# Patient Record
Sex: FEMALE | Race: WHITE | NOT HISPANIC OR LATINO
[De-identification: names, ages, dates, MRNs, and addresses within clinical notes are randomized per-mention and may not be internally consistent; named-entity substitution may affect disease eponyms.]

---

## 2020-06-08 ENCOUNTER — ERX REFILL RESPONSE (OUTPATIENT)
Age: 80
End: 2020-06-08

## 2020-06-08 RX ORDER — PANTOPRAZOLE SODIUM 40 MG/1
TAKE 1 TABLET BY MOUTH TWICE A DAY TABLET, DELAYED RELEASE ORAL
Qty: 180 | Refills: 2

## 2020-07-15 ENCOUNTER — OFFICE VISIT (OUTPATIENT)
Dept: URBAN - METROPOLITAN AREA CLINIC 19 | Facility: CLINIC | Age: 80
End: 2020-07-15
Payer: COMMERCIAL

## 2020-07-15 DIAGNOSIS — D50.9 IRON DEFICIENCY ANEMIA, UNSPECIFIED IRON DEFICIENCY ANEMIA TYPE: ICD-10-CM

## 2020-07-15 DIAGNOSIS — R10.31 RLQ ABDOMINAL PAIN: ICD-10-CM

## 2020-07-15 DIAGNOSIS — R10.32 LLQ ABDOMINAL PAIN: ICD-10-CM

## 2020-07-15 PROCEDURE — 1036F TOBACCO NON-USER: CPT | Performed by: INTERNAL MEDICINE

## 2020-07-15 PROCEDURE — 99214 OFFICE O/P EST MOD 30 MIN: CPT | Performed by: INTERNAL MEDICINE

## 2020-07-15 PROCEDURE — G8427 DOCREV CUR MEDS BY ELIG CLIN: HCPCS | Performed by: INTERNAL MEDICINE

## 2020-07-15 PROCEDURE — G8938 BMI DOC ONL FUP NT DOC: HCPCS | Performed by: INTERNAL MEDICINE

## 2020-07-15 RX ORDER — PANTOPRAZOLE SODIUM 40 MG/1
TAKE 1 TABLET BY MOUTH TWICE A DAY TABLET, DELAYED RELEASE ORAL
Qty: 180 | Refills: 2 | Status: ACTIVE | COMMUNITY

## 2020-07-15 RX ORDER — SUCRALFATE 1 G/1
1 TABLET ON AN EMPTY STOMACH TABLET ORAL TWICE A DAY
Qty: 180 TABLET | Refills: 3 | OUTPATIENT
Start: 2020-07-15 | End: 2021-07-10

## 2020-07-15 RX ORDER — PANTOPRAZOLE SODIUM 40 MG/1
1 TABLET TABLET, DELAYED RELEASE ORAL ONCE A DAY
Qty: 90 | Refills: 3 | OUTPATIENT
Start: 2020-07-15

## 2020-07-15 RX ORDER — ASPIRIN 81 MG/1
TABLET, COATED ORAL
Qty: 0 | Refills: 0 | Status: ACTIVE | COMMUNITY
Start: 1900-01-01

## 2020-07-15 RX ORDER — SUCRALFATE 1 G/1
TAKE 1 TAB BY MOUTH 4 TIMES PER DAY ON AN EMPTY STOMACH 1 HOUR BEFORE MEALS & AT BEDTIME FOR 30 DAYS TABLET ORAL
Qty: 120 | Refills: 1 | Status: ACTIVE | COMMUNITY
Start: 2019-05-21

## 2020-07-15 RX ORDER — PANTOPRAZOLE SODIUM 40 MG
TAKE 1 TABLET BY MOUTH TWICE A DAY TABLET, DELAYED RELEASE (ENTERIC COATED) ORAL
Qty: 180 | Refills: 2 | Status: ACTIVE | COMMUNITY
Start: 2019-09-17

## 2020-07-15 NOTE — HPI-TODAY'S VISIT:
Mrs. Valenzuela is a 80 year old female who was last seen in GI clinic on 1/22/2020 for anemia.    She reports her stool varies in color. She states the stool can be brown, black or yellow. She estimates it has been a month since last having black stool.   Her last HgB was 2-3 months ago. She reports her last iron infusion was around 9/2019.   Today she reports having abdominal pain in RLQ and LLQ. She reports the pain has been present for approximately 2 months. She report the pain can be intense and sharp in character. She reports the pain is worse with defecation. She reports her stools are soft. She reports having a BM once a day and reports the pain is sporadic.   She reports currently being on xalerto. She reports taking protonix 40mg daily and carafate daily.  Prior history is summarized below: -She had an EGD on 1/3/2019 that showed a medium size hiatal hernia and few clean-based gastric ulcers. Pathology from the gastric biopsies were negative for H. pylori.  -On 1/4/2019 she had a colonoscopy that showed a 3 mm sigmoid colon polyp, sigmoid colon diverticulosis and normal appearing terminal ileum. Pathology report mentions polyp was an "adenoma."  -HgB on 1/5/2019 was 7.7.  -She had an EGD on 7/9/2019 which showed moderate hiatal hernia and moderate hemorrhagic gastritis and several erosions in the gastric antrum. Gastric biopsies were negative for H. pylori.  -On 1/3/2020 HgB was 8.2.

## 2020-07-17 LAB
BASO (ABSOLUTE): 0
BASOS: 1
EOS (ABSOLUTE): 0.1
EOS: 2
FERRITIN, SERUM: 30
HEMATOCRIT: 35.3
HEMATOLOGY COMMENTS:: (no result)
HEMOGLOBIN: 11.4
IMMATURE CELLS: (no result)
IMMATURE GRANS (ABS): 0
IMMATURE GRANULOCYTES: 1
IRON BIND.CAP.(TIBC): 359
IRON SATURATION: 9
IRON: 34
LYMPHS (ABSOLUTE): 1.3
LYMPHS: 23
MCH: 32.7
MCHC: 32.3
MCV: 101
MONOCYTES(ABSOLUTE): 0.8
MONOCYTES: 13
NEUTROPHILS (ABSOLUTE): 3.7
NEUTROPHILS: 60
NRBC: (no result)
PLATELETS: 180
RBC: 3.49
RDW: 12.3
UIBC: 325
WBC: 5.9

## 2020-07-21 ENCOUNTER — TELEPHONE ENCOUNTER (OUTPATIENT)
Dept: URBAN - METROPOLITAN AREA CLINIC 19 | Facility: CLINIC | Age: 80
End: 2020-07-21

## 2020-07-31 ENCOUNTER — LAB OUTSIDE AN ENCOUNTER (OUTPATIENT)
Dept: URBAN - METROPOLITAN AREA CLINIC 19 | Facility: CLINIC | Age: 80
End: 2020-07-31

## 2020-07-31 LAB
CREATININE POC: 0.7
PERFORMING LAB: (no result)

## 2023-03-17 ENCOUNTER — LAB OUTSIDE AN ENCOUNTER (OUTPATIENT)
Dept: URBAN - METROPOLITAN AREA CLINIC 19 | Facility: CLINIC | Age: 83
End: 2023-03-17

## 2023-03-17 ENCOUNTER — OFFICE VISIT (OUTPATIENT)
Dept: URBAN - METROPOLITAN AREA CLINIC 19 | Facility: CLINIC | Age: 83
End: 2023-03-17
Payer: COMMERCIAL

## 2023-03-17 ENCOUNTER — WEB ENCOUNTER (OUTPATIENT)
Dept: URBAN - METROPOLITAN AREA CLINIC 19 | Facility: CLINIC | Age: 83
End: 2023-03-17

## 2023-03-17 VITALS
BODY MASS INDEX: 25.8 KG/M2 | TEMPERATURE: 96.8 F | SYSTOLIC BLOOD PRESSURE: 124 MMHG | OXYGEN SATURATION: 72 % | DIASTOLIC BLOOD PRESSURE: 54 MMHG | HEIGHT: 62 IN | HEART RATE: 67 BPM | WEIGHT: 140.2 LBS

## 2023-03-17 DIAGNOSIS — K92.1 BLACK STOOLS: ICD-10-CM

## 2023-03-17 DIAGNOSIS — R10.9 INTERMITTENT ABDOMINAL PAIN: ICD-10-CM

## 2023-03-17 DIAGNOSIS — R68.81 EARLY SATIETY: ICD-10-CM

## 2023-03-17 DIAGNOSIS — R12 HEARTBURN: ICD-10-CM

## 2023-03-17 DIAGNOSIS — R11.10 REGURGITATION OF FOOD: ICD-10-CM

## 2023-03-17 PROCEDURE — 99214 OFFICE O/P EST MOD 30 MIN: CPT | Performed by: NURSE PRACTITIONER

## 2023-03-17 RX ORDER — PANTOPRAZOLE SODIUM 40 MG/1
TAKE 1 TABLET BY MOUTH TWICE A DAY TABLET, DELAYED RELEASE ORAL
Qty: 180 | Refills: 2 | Status: ACTIVE | COMMUNITY

## 2023-03-17 RX ORDER — PANTOPRAZOLE SODIUM 40 MG
TAKE 1 TABLET BY MOUTH TWICE A DAY TABLET, DELAYED RELEASE (ENTERIC COATED) ORAL
Qty: 180 | Refills: 2 | Status: ACTIVE | COMMUNITY
Start: 2019-09-17

## 2023-03-17 NOTE — HPI-TODAY'S VISIT:
Mrs. Valenzuela is an 84 yo female with PMH of HTN, RADHA, CAD, aortic valve replacement 2/23/2023 on Xarelto who presents today for c/o diarrhea. Stools can alternate between normal to watery. She states ongoing for many years. No fever/chills or sick contacts. No new diet or meds. No recent travel. Few weeks ago she had 5 episodes of black stool which now resolved. She reports feeling full easily. Eats about 4-5 bites and has to wait awhile before she can eat again. No appetite. Ongoing about 6 months but no weight loss. Heartburn and reflux. Takes garret chewables which helps. She had pain last week in her upper back that radiated to the front LUQ area. Reports some regurgitaion, nausea, but no vomiting.   She sees Dr. Parker with Hematology. Last EGD was done by Dr. Canales 7/9/2019.  Normal esophagus, medium sized hiatal hernia, hemorrhagic gastritis with several gastric erosions, normal duodenum.  Path pathology from stomach antrum biopsy-reparative-type changes.  No H. pylori. Last colonoscopy was done on 1/5/2019.  Examined portion of ileum was normal.  A 3 mm polyp in the sigmoid colon.  Diverticulosis in the sigmoid colon.  Path from sigmoid colon biopsy with adenoma.

## 2023-03-30 ENCOUNTER — LAB OUTSIDE AN ENCOUNTER (OUTPATIENT)
Dept: URBAN - METROPOLITAN AREA CLINIC 19 | Facility: CLINIC | Age: 83
End: 2023-03-30

## 2023-03-30 ENCOUNTER — OUT OF OFFICE VISIT (OUTPATIENT)
Dept: URBAN - METROPOLITAN AREA MEDICAL CENTER 25 | Facility: MEDICAL CENTER | Age: 83
End: 2023-03-30

## 2023-03-30 ENCOUNTER — CLAIMS CREATED FROM THE CLAIM WINDOW (OUTPATIENT)
Dept: URBAN - METROPOLITAN AREA MEDICAL CENTER 25 | Facility: MEDICAL CENTER | Age: 83
End: 2023-03-30
Payer: COMMERCIAL

## 2023-03-30 DIAGNOSIS — I48.91 A-FIB: ICD-10-CM

## 2023-03-30 DIAGNOSIS — R10.13 ABDOMINAL DISCOMFORT, EPIGASTRIC: ICD-10-CM

## 2023-03-30 DIAGNOSIS — K92.1 ACUTE MELENA: ICD-10-CM

## 2023-03-30 PROCEDURE — 99222 1ST HOSP IP/OBS MODERATE 55: CPT | Performed by: INTERNAL MEDICINE

## 2023-03-30 PROCEDURE — G8427 DOCREV CUR MEDS BY ELIG CLIN: HCPCS | Performed by: INTERNAL MEDICINE

## 2023-03-31 ENCOUNTER — CLAIMS CREATED FROM THE CLAIM WINDOW (OUTPATIENT)
Dept: URBAN - METROPOLITAN AREA MEDICAL CENTER 25 | Facility: MEDICAL CENTER | Age: 83
End: 2023-03-31

## 2023-03-31 ENCOUNTER — TELEPHONE ENCOUNTER (OUTPATIENT)
Dept: URBAN - METROPOLITAN AREA CLINIC 19 | Facility: CLINIC | Age: 83
End: 2023-03-31

## 2023-03-31 ENCOUNTER — CLAIMS CREATED FROM THE CLAIM WINDOW (OUTPATIENT)
Dept: URBAN - METROPOLITAN AREA MEDICAL CENTER 25 | Facility: MEDICAL CENTER | Age: 83
End: 2023-03-31
Payer: COMMERCIAL

## 2023-03-31 DIAGNOSIS — K29.60 ADENOPAPILLOMATOSIS GASTRICA: ICD-10-CM

## 2023-03-31 DIAGNOSIS — K92.1 ACUTE MELENA: ICD-10-CM

## 2023-03-31 PROCEDURE — 43239 EGD BIOPSY SINGLE/MULTIPLE: CPT | Performed by: INTERNAL MEDICINE

## 2023-04-01 ENCOUNTER — CLAIMS CREATED FROM THE CLAIM WINDOW (OUTPATIENT)
Dept: URBAN - METROPOLITAN AREA MEDICAL CENTER 25 | Facility: MEDICAL CENTER | Age: 83
End: 2023-04-01

## 2023-04-01 ENCOUNTER — CLAIMS CREATED FROM THE CLAIM WINDOW (OUTPATIENT)
Dept: URBAN - METROPOLITAN AREA MEDICAL CENTER 25 | Facility: MEDICAL CENTER | Age: 83
End: 2023-04-01
Payer: COMMERCIAL

## 2023-04-01 DIAGNOSIS — K92.1 ACUTE MELENA: ICD-10-CM

## 2023-04-01 DIAGNOSIS — D64.89 ANEMIA DUE TO OTHER CAUSE: ICD-10-CM

## 2023-04-01 DIAGNOSIS — I48.91 A-FIB: ICD-10-CM

## 2023-04-01 PROCEDURE — 99233 SBSQ HOSP IP/OBS HIGH 50: CPT | Performed by: STUDENT IN AN ORGANIZED HEALTH CARE EDUCATION/TRAINING PROGRAM

## 2023-04-01 PROCEDURE — 99232 SBSQ HOSP IP/OBS MODERATE 35: CPT | Performed by: STUDENT IN AN ORGANIZED HEALTH CARE EDUCATION/TRAINING PROGRAM

## 2023-04-20 ENCOUNTER — OFFICE VISIT (OUTPATIENT)
Dept: URBAN - METROPOLITAN AREA CLINIC 19 | Facility: CLINIC | Age: 83
End: 2023-04-20
Payer: COMMERCIAL

## 2023-04-20 ENCOUNTER — WEB ENCOUNTER (OUTPATIENT)
Dept: URBAN - METROPOLITAN AREA CLINIC 19 | Facility: CLINIC | Age: 83
End: 2023-04-20

## 2023-04-20 VITALS
TEMPERATURE: 96.8 F | HEIGHT: 62 IN | WEIGHT: 137.4 LBS | SYSTOLIC BLOOD PRESSURE: 106 MMHG | BODY MASS INDEX: 25.28 KG/M2 | DIASTOLIC BLOOD PRESSURE: 60 MMHG

## 2023-04-20 DIAGNOSIS — R12 HEARTBURN: ICD-10-CM

## 2023-04-20 DIAGNOSIS — K92.1 BLACK STOOLS: ICD-10-CM

## 2023-04-20 DIAGNOSIS — K25.3 ACUTE CAMERON ULCER: ICD-10-CM

## 2023-04-20 DIAGNOSIS — K44.9 HIATAL HERNIA: ICD-10-CM

## 2023-04-20 DIAGNOSIS — D50.0 IRON DEFICIENCY ANEMIA DUE TO CHRONIC BLOOD LOSS: ICD-10-CM

## 2023-04-20 DIAGNOSIS — R68.81 EARLY SATIETY: ICD-10-CM

## 2023-04-20 DIAGNOSIS — R10.12 LUQ ABDOMINAL PAIN: ICD-10-CM

## 2023-04-20 PROBLEM — 724556004: Status: ACTIVE | Noted: 2023-04-20

## 2023-04-20 PROCEDURE — 99214 OFFICE O/P EST MOD 30 MIN: CPT | Performed by: NURSE PRACTITIONER

## 2023-04-20 RX ORDER — SUCRALFATE 1 G/1
1 TABLET 30 MINS BEFORE MEALS, DISSOLVE IN 2 TBS WATER TABLET ORAL TWICE A DAY
Qty: 60 | Refills: 0 | OUTPATIENT
Start: 2023-04-20 | End: 2023-05-20

## 2023-04-20 RX ORDER — PANTOPRAZOLE SODIUM 40 MG/1
1 TABLET TABLET, DELAYED RELEASE ORAL TWICE A DAY
Qty: 60 TABLET | Refills: 1 | OUTPATIENT
Start: 2023-04-20

## 2023-04-20 RX ORDER — PANTOPRAZOLE SODIUM 40 MG
TAKE 1 TABLET BY MOUTH TWICE A DAY TABLET, DELAYED RELEASE (ENTERIC COATED) ORAL
Qty: 180 | Refills: 2 | Status: ACTIVE | COMMUNITY
Start: 2019-09-17

## 2023-04-20 RX ORDER — PANTOPRAZOLE SODIUM 40 MG/1
TAKE 1 TABLET BY MOUTH TWICE A DAY TABLET, DELAYED RELEASE ORAL
Qty: 180 | Refills: 2 | Status: ACTIVE | COMMUNITY

## 2023-04-20 NOTE — HPI-TODAY'S VISIT:
3/17/2023 Roselia Chávez NP   Mrs. Valenzuela is an 84 yo female with PMH of HTN, RADHA, CAD, aortic valve replacement 2/23/2023 on Xarelto who presents today for c/o diarrhea. Stools can alternate between normal to watery. She states ongoing for many years. No fever/chills or sick contacts. No new diet or meds. No recent travel. Few weeks ago she had 5 episodes of black stool which now resolved. She reports feeling full easily. Eats about 4-5 bites and has to wait awhile before she can eat again. No appetite. Ongoing about 6 months but no weight loss. Heartburn and reflux. Takes garret chewables which helps. She had pain last week in her upper back that radiated to the front LUQ area. Reports some regurgitaion, nausea, but no vomiting.   She sees Dr. Parker with Hematology. Last EGD was done by Dr. Canales 7/9/2019.  Normal esophagus, medium sized hiatal hernia, hemorrhagic gastritis with several gastric erosions, normal duodenum.  Path pathology from stomach antrum biopsy-reparative-type changes.  No H. pylori. Last colonoscopy was done on 1/5/2019.  Examined portion of ileum was normal.  A 3 mm polyp in the sigmoid colon.  Diverticulosis in the sigmoid colon.  Path from sigmoid colon biopsy with adenoma. PLAN Labs, EGD/colonoscopy, ultrasound  TODAY 4/20/23 She was admitted to the hospital 3/30/2023 after she presented for black tarry stools, weakness, pale.  History of CHF, A-fib on Xarelto, pulmonary hypertension, aortic valve replacement. Hemoglobin on admission 6.1,  Ferritin 26, iron 19/l, iron saturation 7%/l Abdominal x-ray normal Right upper quadrant ultrasound small right pleural effusion no evidence of cholelithiasis She underwent an EGD 3/31/2023 by Dr. Redding medium hiatal hernia, few nonbleeding superficial [place] ulcers,   BX shows active inflammation and reactive changes. She is to take Protonix twice daily.  She has not had any further black stools. She has not had any labs since leaving the hospital. She thinks she is taking her Pantoprazole BID but seemed a little confused about it. She is not on any iron currently.  She recalls taking Aleve prior to ER visit, along with her Xarelto that she takes daily, no taking any NSAIDS at this time. She does have LUQ pain that radiates into back, intermittent, at random, no improvement since leaving hospital.  Loose stools occur about once a month.   PLAN carafate BID, cont PPI BID, no NSAIDS, recheck labs/iron, hold on colonoscopy at this time.

## 2023-04-21 LAB
ABSOLUTE BASOPHILS: 38
ABSOLUTE EOSINOPHILS: 38
ABSOLUTE LYMPHOCYTES: 1015
ABSOLUTE MONOCYTES: 767
ABSOLUTE NEUTROPHILS: 3542
BASOPHILS: 0.7
EOSINOPHILS: 0.7
FERRITIN, SERUM: 18
HEMATOCRIT: 26.8
HEMOGLOBIN: 8
IRON BIND.CAP.(TIBC): 357
IRON SATURATION: 6
IRON: 21
LYMPHOCYTES: 18.8
MCH: 26.3
MCHC: 29.9
MCV: 88.2
MONOCYTES: 14.2
MPV: 11.4
NEUTROPHILS: 65.6
PLATELET COUNT: 213
RDW: 15.6
RED BLOOD CELL COUNT: 3.04
WHITE BLOOD CELL COUNT: 5.4

## 2023-04-24 ENCOUNTER — TELEPHONE ENCOUNTER (OUTPATIENT)
Dept: URBAN - METROPOLITAN AREA CLINIC 18 | Facility: CLINIC | Age: 83
End: 2023-04-24

## 2023-04-24 ENCOUNTER — TELEPHONE ENCOUNTER (OUTPATIENT)
Dept: URBAN - METROPOLITAN AREA CLINIC 19 | Facility: CLINIC | Age: 83
End: 2023-04-24

## 2023-04-24 RX ORDER — FERRIC CARBOXYMALTOSE INJECTION 50 MG/ML
AS DIRECTED INJECTION, SOLUTION INTRAVENOUS
Qty: 2 | Refills: 0 | OUTPATIENT
Start: 2023-04-24

## 2023-04-24 RX ORDER — PANTOPRAZOLE SODIUM 40 MG/1
TAKE 1 TABLET BY MOUTH TWICE A DAY TABLET, DELAYED RELEASE ORAL
Qty: 180 | Refills: 2 | Status: ACTIVE | COMMUNITY

## 2023-04-24 RX ORDER — PANTOPRAZOLE SODIUM 40 MG/1
1 TABLET TABLET, DELAYED RELEASE ORAL TWICE A DAY
Qty: 60 TABLET | Refills: 1 | Status: ACTIVE | COMMUNITY
Start: 2023-04-20

## 2023-04-24 RX ORDER — PANTOPRAZOLE SODIUM 40 MG
TAKE 1 TABLET BY MOUTH TWICE A DAY TABLET, DELAYED RELEASE (ENTERIC COATED) ORAL
Qty: 180 | Refills: 2 | Status: ACTIVE | COMMUNITY
Start: 2019-09-17

## 2023-04-24 RX ORDER — SUCRALFATE 1 G/1
1 TABLET 30 MINS BEFORE MEALS, DISSOLVE IN 2 TBS WATER TABLET ORAL TWICE A DAY
Qty: 60 | Refills: 0 | Status: ACTIVE | COMMUNITY
Start: 2023-04-20 | End: 2023-05-20

## 2023-04-26 ENCOUNTER — TELEPHONE ENCOUNTER (OUTPATIENT)
Dept: URBAN - METROPOLITAN AREA CLINIC 19 | Facility: CLINIC | Age: 83
End: 2023-04-26

## 2023-05-04 ENCOUNTER — ERX REFILL RESPONSE (OUTPATIENT)
Dept: URBAN - METROPOLITAN AREA CLINIC 19 | Facility: CLINIC | Age: 83
End: 2023-05-04

## 2023-05-04 RX ORDER — SUCRALFATE 1 G/1
1 TABLET 30 MINS BEFORE MEALS, DISSOLVE IN 2 TABLETS WATER ORALLY TWICE A DAY 30 DAYS TABLET ORAL
Qty: 60 TABLET | Refills: 0 | OUTPATIENT

## 2023-05-04 RX ORDER — SUCRALFATE 1 G/1
1 TABLET 30 MINS BEFORE MEALS, DISSOLVE IN 2 TBS WATER TABLET ORAL TWICE A DAY
Qty: 60 | Refills: 0 | OUTPATIENT

## 2023-05-30 ENCOUNTER — ERX REFILL RESPONSE (OUTPATIENT)
Dept: URBAN - METROPOLITAN AREA CLINIC 19 | Facility: CLINIC | Age: 83
End: 2023-05-30

## 2023-05-30 RX ORDER — PANTOPRAZOLE SODIUM 40 MG/1
1 TABLET TABLET, DELAYED RELEASE ORAL TWICE A DAY
Qty: 60 TABLET | Refills: 1 | OUTPATIENT

## 2023-05-30 RX ORDER — PANTOPRAZOLE SODIUM 40 MG/1
TAKE 1 TABLET BY MOUTH TWICE A DAY FOR 30 DAYS TABLET, DELAYED RELEASE ORAL
Qty: 60 TABLET | Refills: 1 | OUTPATIENT

## 2023-05-31 ENCOUNTER — ERX REFILL RESPONSE (OUTPATIENT)
Dept: URBAN - METROPOLITAN AREA CLINIC 19 | Facility: CLINIC | Age: 83
End: 2023-05-31

## 2023-05-31 RX ORDER — SUCRALFATE 1 G/1
1 TABLET 30 MINS BEFORE MEALS, DISSOLVE IN 2 TABLETS WATER ORALLY TWICE A DAY 30 DAYS TABLET ORAL
Qty: 180 TABLET | Refills: 1 | OUTPATIENT

## 2023-05-31 RX ORDER — SUCRALFATE 1 G/1
1 TABLET 30 MINS BEFORE MEALS, DISSOLVE IN 2 TABLETS WATER ORALLY TWICE A DAY 30 DAYS TABLET ORAL
Qty: 60 TABLET | Refills: 0 | OUTPATIENT

## 2023-06-21 ENCOUNTER — DASHBOARD ENCOUNTERS (OUTPATIENT)
Age: 83
End: 2023-06-21

## 2023-06-22 ENCOUNTER — OFFICE VISIT (OUTPATIENT)
Dept: URBAN - METROPOLITAN AREA CLINIC 19 | Facility: CLINIC | Age: 83
End: 2023-06-22

## 2023-06-22 RX ORDER — SUCRALFATE 1 G/1
1 TABLET 30 MINS BEFORE MEALS, DISSOLVE IN 2 TABLETS WATER ORALLY TWICE A DAY 30 DAYS TABLET ORAL
Qty: 180 TABLET | Refills: 1 | Status: ACTIVE | COMMUNITY

## 2023-06-22 RX ORDER — PANTOPRAZOLE SODIUM 40 MG/1
TAKE 1 TABLET BY MOUTH TWICE A DAY FOR 30 DAYS TABLET, DELAYED RELEASE ORAL
Qty: 60 TABLET | Refills: 1 | Status: ACTIVE | COMMUNITY

## 2023-06-22 RX ORDER — FERRIC CARBOXYMALTOSE INJECTION 50 MG/ML
AS DIRECTED INJECTION, SOLUTION INTRAVENOUS
Qty: 2 | Refills: 0 | Status: ACTIVE | COMMUNITY
Start: 2023-04-24

## 2023-06-22 RX ORDER — PANTOPRAZOLE SODIUM 40 MG
TAKE 1 TABLET BY MOUTH TWICE A DAY TABLET, DELAYED RELEASE (ENTERIC COATED) ORAL
Qty: 180 | Refills: 2 | Status: ACTIVE | COMMUNITY
Start: 2019-09-17

## 2023-06-28 ENCOUNTER — ERX REFILL RESPONSE (OUTPATIENT)
Dept: URBAN - METROPOLITAN AREA CLINIC 19 | Facility: CLINIC | Age: 83
End: 2023-06-28

## 2023-06-28 RX ORDER — PANTOPRAZOLE SODIUM 40 MG/1
TAKE 1 TABLET BY MOUTH TWICE A DAY FOR 30 DAYS TABLET, DELAYED RELEASE ORAL
Qty: 60 TABLET | Refills: 1 | OUTPATIENT

## 2023-06-28 RX ORDER — PANTOPRAZOLE SODIUM 40 MG/1
1 TABLET TABLET, DELAYED RELEASE ORAL ONCE A DAY
Qty: 30 | Refills: 5 | OUTPATIENT

## 2023-08-04 ENCOUNTER — ERX REFILL RESPONSE (OUTPATIENT)
Dept: URBAN - METROPOLITAN AREA CLINIC 19 | Facility: CLINIC | Age: 83
End: 2023-08-04

## 2023-08-04 RX ORDER — PANTOPRAZOLE SODIUM 40 MG/1
1 TABLET TABLET, DELAYED RELEASE ORAL ONCE A DAY
Qty: 30 | Refills: 5 | OUTPATIENT

## 2023-08-04 RX ORDER — PANTOPRAZOLE SODIUM 40 MG/1
TAKE 1 TABLET BY MOUTH EVERY DAY FOR 30 DAYS TABLET, DELAYED RELEASE ORAL
Qty: 90 TABLET | Refills: 1 | OUTPATIENT

## 2024-05-28 ENCOUNTER — OFFICE VISIT (OUTPATIENT)
Dept: URBAN - METROPOLITAN AREA CLINIC 19 | Facility: CLINIC | Age: 84
End: 2024-05-28
Payer: COMMERCIAL

## 2024-05-28 ENCOUNTER — LAB OUTSIDE AN ENCOUNTER (OUTPATIENT)
Dept: URBAN - METROPOLITAN AREA CLINIC 19 | Facility: CLINIC | Age: 84
End: 2024-05-28

## 2024-05-28 VITALS
SYSTOLIC BLOOD PRESSURE: 140 MMHG | TEMPERATURE: 97.8 F | HEIGHT: 62 IN | HEART RATE: 95 BPM | OXYGEN SATURATION: 99 % | WEIGHT: 131 LBS | DIASTOLIC BLOOD PRESSURE: 90 MMHG | BODY MASS INDEX: 24.11 KG/M2

## 2024-05-28 DIAGNOSIS — R19.7 DIARRHEA: ICD-10-CM

## 2024-05-28 DIAGNOSIS — R10.31 RLQ ABDOMINAL PAIN: ICD-10-CM

## 2024-05-28 PROCEDURE — 99212 OFFICE O/P EST SF 10 MIN: CPT

## 2024-05-28 RX ORDER — PANTOPRAZOLE SODIUM 40 MG
TAKE 1 TABLET BY MOUTH TWICE A DAY TABLET, DELAYED RELEASE (ENTERIC COATED) ORAL
Qty: 180 | Refills: 2 | Status: ACTIVE | COMMUNITY
Start: 2019-09-17

## 2024-05-28 RX ORDER — FERRIC CARBOXYMALTOSE INJECTION 50 MG/ML
AS DIRECTED INJECTION, SOLUTION INTRAVENOUS
Qty: 2 | Refills: 0 | Status: ACTIVE | COMMUNITY
Start: 2023-04-24

## 2024-05-28 RX ORDER — SUCRALFATE 1 G/1
1 TABLET 30 MINS BEFORE MEALS, DISSOLVE IN 2 TABLETS WATER ORALLY TWICE A DAY 30 DAYS TABLET ORAL
Qty: 180 TABLET | Refills: 1 | Status: ACTIVE | COMMUNITY

## 2024-05-28 RX ORDER — PANTOPRAZOLE SODIUM 40 MG/1
TAKE 1 TABLET BY MOUTH EVERY DAY FOR 30 DAYS TABLET, DELAYED RELEASE ORAL
Qty: 90 TABLET | Refills: 1 | Status: ACTIVE | COMMUNITY

## 2024-06-04 ENCOUNTER — LAB OUTSIDE AN ENCOUNTER (OUTPATIENT)
Dept: URBAN - METROPOLITAN AREA CLINIC 19 | Facility: CLINIC | Age: 84
End: 2024-06-04

## 2024-06-04 LAB
CREATININE POC: 0.7
PERFORMING LAB: (no result)

## 2024-06-06 ENCOUNTER — TELEPHONE ENCOUNTER (OUTPATIENT)
Dept: URBAN - METROPOLITAN AREA CLINIC 19 | Facility: CLINIC | Age: 84
End: 2024-06-06

## 2024-06-06 RX ORDER — DICYCLOMINE HYDROCHLORIDE 20 MG/1
1 TABLET TABLET ORAL THREE TIMES A DAY
Qty: 90 | Refills: 1 | OUTPATIENT
Start: 2024-06-06 | End: 2024-08-05

## 2024-07-09 ENCOUNTER — TELEPHONE ENCOUNTER (OUTPATIENT)
Dept: URBAN - METROPOLITAN AREA CLINIC 19 | Facility: CLINIC | Age: 84
End: 2024-07-09

## 2024-07-09 RX ORDER — HYOSCYAMINE SULFATE 0.125 MG
1 TABLET ON THE TONGUE AND ALLOW TO DISSOLVE AS NEEDED TABLET,DISINTEGRATING ORAL
Qty: 120 | Refills: 1 | OUTPATIENT
Start: 2024-07-15 | End: 2024-09-13